# Patient Record
Sex: FEMALE | Race: WHITE | Employment: FULL TIME
[De-identification: names, ages, dates, MRNs, and addresses within clinical notes are randomized per-mention and may not be internally consistent; named-entity substitution may affect disease eponyms.]

---

## 2017-10-11 DIAGNOSIS — Z30.011 ENCOUNTER FOR INITIAL PRESCRIPTION OF CONTRACEPTIVE PILLS: ICD-10-CM

## 2017-10-11 NOTE — TELEPHONE ENCOUNTER
norethindrone-ethinyl estradiol (MICROGESTIN 1/20) 1-20 MG-MCG per tablet      Last Written Prescription Date: 08/10/16  Last Fill Quantity: 84, # refills: 3  Last Office Visit with [facility], [facility] or [facility] prescribing provider: 08/10/17       BP Readings from Last 3 Encounters:   08/10/16 120/76   05/28/15 114/84   03/12/15 105/80     Date of last Breast Exam: not charted

## 2017-10-11 NOTE — TELEPHONE ENCOUNTER
[facility]  Pt due for physical.  Is pt still taking this?  Should have run out in August 2017  Marielle Benton RN, BSN

## 2017-10-12 RX ORDER — NORETHINDRONE ACETATE AND ETHINYL ESTRADIOL .02; 1 MG/1; MG/1
1 TABLET ORAL DAILY
Qty: 84 TABLET | Refills: 3 | OUTPATIENT
Start: 2017-10-12

## 2017-10-12 NOTE — TELEPHONE ENCOUNTER
Yann Cobb,     Sorry, when I got the email from my pharmacy to refill my prescription I just automatically clicked to refill without really thinking.  I don't need the prescription - I'm pregnant actually :)  And I've since moved across town, so am no longer using your clinic.     Thank you!   Kayley

## 2019-11-08 ENCOUNTER — HEALTH MAINTENANCE LETTER (OUTPATIENT)
Age: 39
End: 2019-11-08

## 2020-12-06 ENCOUNTER — HEALTH MAINTENANCE LETTER (OUTPATIENT)
Age: 40
End: 2020-12-06

## 2021-09-25 ENCOUNTER — HEALTH MAINTENANCE LETTER (OUTPATIENT)
Age: 41
End: 2021-09-25

## 2022-01-15 ENCOUNTER — HEALTH MAINTENANCE LETTER (OUTPATIENT)
Age: 42
End: 2022-01-15

## 2023-01-07 ENCOUNTER — HEALTH MAINTENANCE LETTER (OUTPATIENT)
Age: 43
End: 2023-01-07

## 2023-04-22 ENCOUNTER — HEALTH MAINTENANCE LETTER (OUTPATIENT)
Age: 43
End: 2023-04-22

## 2024-02-10 ENCOUNTER — HEALTH MAINTENANCE LETTER (OUTPATIENT)
Age: 44
End: 2024-02-10